# Patient Record
(demographics unavailable — no encounter records)

---

## 2019-03-07 NOTE — MMO
BILATERAL SCREENING MAMMOGRAM: 

 

Date:  03/06/19

 

HISTORY:  

Screening.

 

COMPARISON:  

Mammograms from 2016, 2015, and 2014. 

 

TECHNIQUE:  

Bilateral screening CC and MLO mammograms. 

 

This patient's mammogram was interpreted with the assistance of computer-aided detection.  

 

FINDINGS:

The breasts are heterogeneously dense, which may obscure small masses. There are benign-appearing jerad
cifications within both breasts. 

 

No suspicious mass, architectural distortion, or microcalcifications. 

 

IMPRESSION: 

 

BIRADS 2:  Benign Finding(s)

 

Continued screening is recommended. 

 

POS: [facility]

## 2020-05-04 NOTE — RAD
LEFT HIP TWO VIEWS:

 

History: Left hip pain, decreased range of motion. 

 

FINDINGS: 

There is severe arthritic changes of the hip. There is prominent osteophytic change along the femoral
 head and neck junction with severe joint space narrowing, subchondral cystic change and some slight 
flattening to the femoral head suggest underlying osteonecrosis, probably secondary to the advanced o
steoarthritic change. 

 

IMPRESSION: 

Severe osteoarthritic change to the hip.

 

POS: [facility]

## 2020-05-04 NOTE — RAD
RIGHT HIP TWO VIEWS:

 

History: Hip pain. 

 

FINDINGS: 

The changes in the right hip are somewhat less severe than left, but still very pronounced. Severe marianne
int space narrowing, particularly superiorly. Again, some developing deformity to the femoral head. P
rominent spur formation along the femoral head and neck junction and evidence for some developing ost
eonecrosis with subchondral cystic change of the femoral head and some changes to the acetabulum. 

 

IMPRESSION: 

Severe osteoarthritic changes of the hip. 

 

POS: [facility]

## 2020-11-16 NOTE — RAD
RIGHT TOES 3 VIEWS:

 

Date:  11/16/2020

 

HISTORY:  

Right great toe pain. 

 

FINDINGS:

Very severe osteoarthrosis changes noted of the first metatarsophalangeal joint with marked narrowing
, sclerosis, and hypertrophic osteophytosis. No other acute fracture or dislocation. 

 

IMPRESSION: 

Severe arthrosis change of the first tarsophalangeal joint. 

 

 

POS: [facility]